# Patient Record
Sex: FEMALE | Race: BLACK OR AFRICAN AMERICAN | NOT HISPANIC OR LATINO | Employment: PART TIME
[De-identification: names, ages, dates, MRNs, and addresses within clinical notes are randomized per-mention and may not be internally consistent; named-entity substitution may affect disease eponyms.]

---
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
## 2020-03-10 ENCOUNTER — HEALTH MAINTENANCE LETTER (OUTPATIENT)
Age: 20
End: 2020-03-10

## 2020-12-27 ENCOUNTER — HEALTH MAINTENANCE LETTER (OUTPATIENT)
Age: 20
End: 2020-12-27

## 2021-04-24 ENCOUNTER — HEALTH MAINTENANCE LETTER (OUTPATIENT)
Age: 21
End: 2021-04-24

## 2021-10-09 ENCOUNTER — HEALTH MAINTENANCE LETTER (OUTPATIENT)
Age: 21
End: 2021-10-09

## 2022-05-16 ENCOUNTER — HEALTH MAINTENANCE LETTER (OUTPATIENT)
Age: 22
End: 2022-05-16

## 2022-09-11 ENCOUNTER — HEALTH MAINTENANCE LETTER (OUTPATIENT)
Age: 22
End: 2022-09-11

## 2022-11-25 ENCOUNTER — HOSPITAL ENCOUNTER (EMERGENCY)
Facility: CLINIC | Age: 22
Discharge: HOME | End: 2022-11-25
Attending: INTERNAL MEDICINE | Admitting: INTERNAL MEDICINE
Payer: COMMERCIAL

## 2022-11-25 VITALS
HEART RATE: 114 BPM | SYSTOLIC BLOOD PRESSURE: 141 MMHG | OXYGEN SATURATION: 98 % | DIASTOLIC BLOOD PRESSURE: 71 MMHG | TEMPERATURE: 98.6 F

## 2022-11-25 DIAGNOSIS — H60.391 INFECTIVE OTITIS EXTERNA, RIGHT: ICD-10-CM

## 2022-11-25 PROCEDURE — 99283 EMERGENCY DEPT VISIT LOW MDM: CPT

## 2022-11-25 PROCEDURE — 99284 EMERGENCY DEPT VISIT MOD MDM: CPT | Performed by: INTERNAL MEDICINE

## 2022-11-25 RX ORDER — NEOMYCIN SULFATE, POLYMYXIN B SULFATE, HYDROCORTISONE 3.5; 10000; 1 MG/ML; [USP'U]/ML; MG/ML
2 SOLUTION/ DROPS AURICULAR (OTIC) 3 TIMES DAILY
Qty: 10 ML | Refills: 0 | Status: SHIPPED | OUTPATIENT
Start: 2022-11-25

## 2022-11-25 ASSESSMENT — ENCOUNTER SYMPTOMS
ABDOMINAL PAIN: 0
FACIAL SWELLING: 0
NECK STIFFNESS: 0
EYE PAIN: 0
ARTHRALGIAS: 0
SORE THROAT: 0
SHORTNESS OF BREATH: 0
DIFFICULTY URINATING: 0
COLOR CHANGE: 0
CONFUSION: 0
FEVER: 0
RHINORRHEA: 0
EYE DISCHARGE: 0
EYE ITCHING: 0
HEADACHES: 0
COUGH: 0
EYE REDNESS: 0

## 2022-11-25 NOTE — ED TRIAGE NOTES
Pt presents with bilateral ear pain that started three weeks ago. Pt endorses symptoms not improving

## 2022-11-25 NOTE — ED PROVIDER NOTES
ED Provider Note  [facility]      History     Chief Complaint   Patient presents with     Otalgia     HPI  Becca Sweeney is a 22 year old female who presents with bilateral eay discomfort of weeks. No injuries or URI symptoms. Just treated with ear drops that has run out.    Past Medical History  No past medical history on file.  No past surgical history on file.  neomycin-polymyxin-hydrocortisone (CORTISPORIN) 3.5-74115-0 otic solution  Prenatal Vit-Fe Fumarate-FA (PRENATAL PO)      No Known Allergies  Family History  No family history on file.  Social History   Social History     Tobacco Use     Smoking status: Former   Substance Use Topics     Alcohol use: No     Drug use: No      Past medical history, past surgical history, medications, allergies, family history, and social history were reviewed with the patient. No additional pertinent items.       Review of Systems   Constitutional: Negative for fever.   HENT: Positive for ear discharge and ear pain. Negative for congestion, facial swelling, hearing loss, mouth sores, rhinorrhea and sore throat.    Eyes: Negative for pain, discharge, redness and itching.   Respiratory: Negative for cough and shortness of breath.    Cardiovascular: Negative for chest pain.   Gastrointestinal: Negative for abdominal pain.   Genitourinary: Negative for difficulty urinating.   Musculoskeletal: Negative for arthralgias and neck stiffness.   Skin: Negative for color change.   Neurological: Negative for headaches.   Psychiatric/Behavioral: Negative for confusion.     A complete review of systems was performed with pertinent positives and negatives noted in the HPI, and all other systems negative.    Physical Exam   BP: (!) 141/71  Pulse: 114  Temp: 98.6  F (37  C)  SpO2: 98 %  Physical Exam  Constitutional:       General: She is not in acute distress.     Appearance: She is not diaphoretic.   HENT:      Head: Atraumatic.      Right Ear: Tympanic membrane  normal. No decreased hearing noted. Drainage and tenderness present.      Left Ear: Tympanic membrane, ear canal and external ear normal. No decreased hearing noted.      Ears:        Mouth/Throat:      Mouth: Oropharynx is clear and moist.      Pharynx: No oropharyngeal exudate.   Eyes:      General: No scleral icterus.     Pupils: Pupils are equal, round, and reactive to light.   Cardiovascular:      Pulses: Intact distal pulses.      Heart sounds: Normal heart sounds.   Pulmonary:      Effort: No respiratory distress.      Breath sounds: Normal breath sounds.   Abdominal:      General: Bowel sounds are normal.      Palpations: Abdomen is soft.      Tenderness: There is no abdominal tenderness.   Musculoskeletal:         General: No tenderness or edema.   Skin:     General: Skin is warm.      Findings: No rash.         ED Course      Procedures                     No results found for any visits on 11/25/22.  Medications - No data to display     Assessments & Plan (with Medical Decision Making)  Right AOE, will restart cortisporin otic solution, bacitracin to ear lobe, follow up with her PMD in one week.       I have reviewed the nursing notes. I have reviewed the findings, diagnosis, plan and need for follow up with the patient.    Discharge Medication List as of 11/25/2022  1:31 PM      START taking these medications    Details   neomycin-polymyxin-hydrocortisone (CORTISPORIN) 3.5-49677-3 otic solution Place 2 drops into the right ear 3 times daily, Disp-10 mL, R-0, Local Print             Final diagnoses:   Infective otitis externa, right       --  Enoc Serrano MD  [facility] EMERGENCY DEPARTMENT  11/25/2022     Enoc Serrano MD  11/25/22 2041

## 2023-06-02 ENCOUNTER — MEDICAL CORRESPONDENCE (OUTPATIENT)
Dept: HEALTH INFORMATION MANAGEMENT | Facility: CLINIC | Age: 23
End: 2023-06-02
Payer: COMMERCIAL

## 2023-06-02 ENCOUNTER — TRANSCRIBE ORDERS (OUTPATIENT)
Dept: MATERNAL FETAL MEDICINE | Facility: CLINIC | Age: 23
End: 2023-06-02
Payer: COMMERCIAL

## 2023-06-02 ENCOUNTER — TRANSFERRED RECORDS (OUTPATIENT)
Dept: HEALTH INFORMATION MANAGEMENT | Facility: CLINIC | Age: 23
End: 2023-06-02
Payer: COMMERCIAL

## 2023-06-02 DIAGNOSIS — O26.90 PREGNANCY RELATED CONDITION, ANTEPARTUM: Primary | ICD-10-CM

## 2023-06-03 ENCOUNTER — HEALTH MAINTENANCE LETTER (OUTPATIENT)
Age: 23
End: 2023-06-03

## 2023-07-03 ENCOUNTER — PRE VISIT (OUTPATIENT)
Dept: MATERNAL FETAL MEDICINE | Facility: CLINIC | Age: 23
End: 2023-07-03
Payer: COMMERCIAL

## 2023-07-06 NOTE — PROGRESS NOTES
[facility]  Genetic Counseling Consult    Patient:  Becca Sweeney YOB: 2000   Date of Service:  23   MRN: [id number]    Becca was seen at the [facility] for genetic counseling consultation to discuss the options for testing for fetal chromosome abnormalities.  The patient was accompanied by her partner, Vlad to today's visit.     IMPRESSION/ PLAN   1. Becca was initially planning to pursue NIPT today. She reported ongoing spotting and cramping for 1 week. Ultrasound opening today was offered and accepted, which unfortunately revealed a demise. Given this is the couple's third miscarriage, genetic POC studies are recommended. Patient understandably saddened by today's information. She stated she does not have any thoughts or concerns about hurting herself or others and feels supported by her partner. We reviewed availability of behavioral health clinician, Mary Jane Gambino and patient is agreeable to Mary Jane reaching out to her on Monday.     2. Becca reported that her mother requires regular blood transfusions in [place]. We reviewed concern for possible hemoglobinopathy. Becca was initially planning to pursue hemoglobin evaluation today, however following demise on ultrasound, she declined any blood work. She is aware this remains available to her.     3. Becca shared that she believes she has a pacemaker placed as a child after episodes of fainting though is unclear of specific details/ diagnosis. We reviewed concern for cardiac condition such as an arrhythmia or cardiomyopathy which we reviewed can be inherited and it is recommended that she be referred to cardiology.     PREGNANCY HISTORY   /Parity:    Age at Delivery: 23 year old  Gestational Age: 13w4d   JERED: 2024, by Ultrasound             No significant complications or exposures were reported in the current pregnancy.  Becca's pregnancy history  "is significant for:     One term vaginal delivery and two SAB's    MEDICAL HISTORY   Becca shared that she believes she has a pacemaker placed as a child after episodes of fainting though is unclear of specific details/ diagnosis. We reviewed concern for cardiac condition such as an arrhythmia or cardiomyopathy which we reviewed can be inherited and it is recommended that she be referred to cardiology.        FAMILY HISTORY   A three-generation pedigree was obtained today and is scanned under the \"Media\" tab in Epic.     The following significant findings were reported today:   Becca's 26yo maternal half sister was reported to have metastatic breast cancer. We discussed how most cancer seen in families occurs sporadically, but about 5-10% may be due to an underlying genetic etiology. The couple was encouraged to share this family history information with their primary care providers to ensure appropriate screening. The [facility]'s cancer risk management clinic also remains available if she or their family members are interested in more information. She was encouraged to follow up with her sister to gather more information about if she was offered or underwent any genetic testing for her cancer history.   Becca reported that her mother requires regular blood transfusions in [place]. We reviewed concern for possible hemoglobinopathy and autosomal recessive inheritance. Becca was initially planning to pursue hemoglobin evaluation today, however following demise on ultrasound, she declined any blood work. She is aware this remains available to her. She can also follow up if any additional information is gathered regarding the specifics of her mother's condition for a more accurate risk assessment.   Vlad's father reportedly passed away at age 54 due to a DVT. We briefly reviewed that there are inherited thrombophilias where individuals can have a predisposition to developing blood clots. He was encouraged to " share this family history information with his primary care provider to ensure appropriate screening.     Otherwise, the reported family history is unremarkable for multiple miscarriages, stillbirths, birth defects, intellectual disabilities, known genetic conditions, and consanguinity.       CARRIER SCREENING   Expanded carrier screening is available to screen for autosomal recessive conditions and X-linked conditions in a large list of genes. Autosomal recessive conditions happen when a mutation has been inherited from the egg and sperm and include conditions like cystic fibrosis, thalassemia, hearing loss, spinal muscular atrophy, and more. X-linked conditions happen when a mutation has been inherited from the egg and include conditions like fragile X syndrome.  screening was also reviewed.    Becca reported that her mother requires regular blood transfusions in [place]. We reviewed concern for possible hemoglobinopathy. Becca was initially planning to pursue hemoglobin evaluation today, however following demise on ultrasound, she declined any blood work. She is aware this remains available to her. They declined expanded carrier screening.        RISK ASSESSMENT FOR CHROMOSOME CONDITIONS   We explained that the risk for fetal chromosome abnormalities increases with maternal age. We discussed specific features of common chromosome abnormalities, including Down syndrome, trisomy 13, trisomy 18, and sex chromosome trisomies.      At age 23 at midtrimester, the risk to have a baby with Down syndrome is 1 in 1087.    At age 23 at midtrimester, the risk to have a baby with any chromosome abnormality is 1 in 544.     GENETIC TESTING OPTIONS   Genetic testing during a pregnancy includes screening and diagnostic procedures.      We discussed the following screening options:   Non-invasive prenatal testing (NIPT)    Also called cell-free DNA screening because it detects chromosomes from the placenta in the pregnant  person's blood    Can be done any time after 10 weeks gestation    Screens for trisomy 21, trisomy 18, trisomy 13, and sex chromosome aneuploidies    Cannot screen for open neural tube defects, maternal serum AFP after 15 weeks is recommended    AFP only    Blood work from arm for levels of AFP (alpha-fetoprotein)    Can be done between 14w1d and 23w6d gestation    Screens for open neural tube defects like spina bifida    Recommended for those that do not want genetic testing (for chromosome conditions), those who did screening other than the quad screen, and those with a personal or family history of neural tube defects.      We discussed the following ultrasound options:  Nuchal translucency (NT) ultrasound    Ultrasound between [id number] that includes nuchal translucency measurement and nasal bone assessments    Nuchal translucency refers to the space at the back of the neck where fluid builds up. All babies at this stage have fluid and there is only concern if there is too much fluid    Nasal bone refers to the small bone in the nose. There is concern for conditions like Down syndrome if the bone cannot be seen at all    This ultrasound can be done as part of first trimester screening, at the same time as another screen (NIPT), at the same time as a CVS, or if the patients does not want genetic screening.    Markers on ultrasound detects about 70% of pregnancies with aneuploidy    Abnormalities on NT ultrasound can also increase the risk for a birth defect, like a heart defect      We discussed the following diagnostic options:   Chorionic villus sampling (CVS)    Invasive diagnostic procedure done between 10w0d and 13w6d    The procedure collects a small sample from the placenta for the purpose of chromosomal testing and/or other genetic testing    Diagnostic result; more than 99% sensitivity for fetal chromosome abnormalities    Cannot screen for open neural tube defects, maternal serum AFP after 15 weeks is  recommended    Amniocentesis    Invasive diagnostic procedure done after 15 weeks gestation    The procedure collects a small sample of amniotic fluid for the purpose of chromosomal testing and/or other genetic testing    Diagnostic result; more than 99% sensitivity for fetal chromosome abnormalities    Testing for AFP in the amniotic fluid can test for open neural tube defects      The benefits and limitations of noninvasive screening were reviewed. Screening tests provide a risk assessment (chance) specific to the pregnancy for certain fetal chromosome abnormalities but cannot definitively diagnose or exclude a fetal chromosome abnormality. Follow-up genetic counseling and consideration of diagnostic testing is recommended with any abnormal screening result. Diagnostic testing during a pregnancy is more certain and can test for more conditions. However, the tests do have a risk of miscarriage that requires careful consideration. These tests can detect fetal chromosome abnormalities with greater than 99% certainty. Results can be compromised by maternal cell contamination or mosaicism and are limited by the resolution of current genetic testing technology. There is no screening or diagnostic test that detects all forms of birth defects or intellectual disability.     It was a pleasure to be involved with [facility]. Face-to-face time of the meeting was 45 minutes.    Karrie Gooden MS, [facility]  Licensed Genetic Counselor  [facility]  Maternal Fetal Medicine  Ph: [phone]  [email or web address]

## 2023-07-07 ENCOUNTER — HOSPITAL ENCOUNTER (EMERGENCY)
Facility: CLINIC | Age: 23
End: 2023-07-07
Payer: COMMERCIAL

## 2023-07-07 ENCOUNTER — OFFICE VISIT (OUTPATIENT)
Dept: MATERNAL FETAL MEDICINE | Facility: CLINIC | Age: 23
End: 2023-07-07
Attending: ADVANCED PRACTICE MIDWIFE
Payer: COMMERCIAL

## 2023-07-07 ENCOUNTER — OFFICE VISIT (OUTPATIENT)
Dept: MATERNAL FETAL MEDICINE | Facility: CLINIC | Age: 23
End: 2023-07-07
Attending: OBSTETRICS & GYNECOLOGY
Payer: COMMERCIAL

## 2023-07-07 ENCOUNTER — HOSPITAL ENCOUNTER (OUTPATIENT)
Dept: ULTRASOUND IMAGING | Facility: CLINIC | Age: 23
Discharge: HOME | End: 2023-07-07
Attending: OBSTETRICS & GYNECOLOGY
Payer: COMMERCIAL

## 2023-07-07 DIAGNOSIS — O26.20 RECURRENT PREGNANCY LOSS, ANTEPARTUM CONDITION OR COMPLICATION: ICD-10-CM

## 2023-07-07 DIAGNOSIS — Z36.9 ENCOUNTER FOR ANTENATAL SCREENING: Primary | ICD-10-CM

## 2023-07-07 DIAGNOSIS — Z83.2 FAMILY HISTORY OF ANEMIA: ICD-10-CM

## 2023-07-07 DIAGNOSIS — O26.90 PREGNANCY RELATED CONDITION, ANTEPARTUM: ICD-10-CM

## 2023-07-07 DIAGNOSIS — O36.4XX0 FETAL DEATH AFFECTING MANAGEMENT OF MOTHER, SINGLE OR UNSPECIFIED FETUS: Primary | ICD-10-CM

## 2023-07-07 PROCEDURE — 96040 HC GENETIC COUNSELING, EACH 30 MINUTES: CPT | Performed by: GENETIC COUNSELOR, MS

## 2023-07-07 PROCEDURE — 99204 OFFICE O/P NEW MOD 45 MIN: CPT | Mod: 25 | Performed by: OBSTETRICS & GYNECOLOGY

## 2023-07-07 PROCEDURE — 76801 OB US < 14 WKS SINGLE FETUS: CPT

## 2023-07-07 PROCEDURE — 76801 OB US < 14 WKS SINGLE FETUS: CPT | Mod: 26 | Performed by: OBSTETRICS & GYNECOLOGY

## 2023-07-07 NOTE — PROGRESS NOTES
Patient referred for First Trimester Ultrasound.    This is single intrauterine gestation at 13 4/7 weeks by 8 week ultrasound.     Abnormal first trimester examination:  No cardiac activity is detected in the fetus and size is less than dates as shown.     Large bilateral cranial cystic structures. Calvarium difficult to observe. Findings consistent with cystic hygroma.    I have discussed today's findings with patient and partner: fetal death. Recommend proceeding with suction curettage to remove POC. Recommend genetic testing of POC.    This is the third pregnancy loss within the same period in pregnancy over the past 3 years and consistent with recurrent pregnancy loss.    Recommend evaluation of patient for recurrent pregnancy loss. Given today's findings, genetic analysis of POC will be important before pursuing parental karyotype for balanced translocation.     Additional tests include HSG, maternal PRL and TSH, and APLA.    Patient reports having an implanted pacemaker device which was not previously reported. Recommend consultation with cardiology prior to future pregnancy.    Patient reports that her mother has been recipient of multiple blood transfusions in [place]. She met with GC today to discuss testing for hemoglobinopathy beginning with hemoglobin electrophoresis.    I have contacted PCP to report findings and recommendation for care.    Work excuse given for 72 hours.    I personally spent a total of 45 minutes, including both face-to-face and non-face-to-face on the date of the encounter, addressing the above diagnosis. Activities performed in this time include chart review, obtaining / reviewing history, performing a medically necessary evaluation, documentation and Charge activities: counseling, care coordination, ordering tests and reviewing results, equivalent to medical decision making that is of moderate complexity.

## 2023-07-07 NOTE — LETTER
July 7, 2023      To Whom It May Concern,    Due to health complications, Becca FROST Sweeney (2000) will need to be off of work through 7/10/23.  If you have further questions, please do not hesitate to contact me.      Regards,      Dr. Gus URRUTIA [facility] [place]  [address]  [facility] [address]  [phone]

## 2023-07-11 ENCOUNTER — TELEPHONE (OUTPATIENT)
Dept: BEHAVIORAL HEALTH | Facility: CLINIC | Age: 23
End: 2023-07-11
Payer: COMMERCIAL

## 2023-07-11 NOTE — TELEPHONE ENCOUNTER
Patient outreach per [facility] referral. Spoke with patient and scheduled visit for 7/17.

## 2023-07-17 ENCOUNTER — TELEPHONE (OUTPATIENT)
Dept: MATERNAL FETAL MEDICINE | Facility: CLINIC | Age: 23
End: 2023-07-17
Payer: COMMERCIAL

## 2023-07-17 NOTE — TELEPHONE ENCOUNTER
Called patient as she had not arrived for previously scheduled [facility] visit. No answer, left VM welcoming patient tor reschedule.

## 2024-07-13 ENCOUNTER — HEALTH MAINTENANCE LETTER (OUTPATIENT)
Age: 24
End: 2024-07-13

## 2025-07-19 ENCOUNTER — HEALTH MAINTENANCE LETTER (OUTPATIENT)
Age: 25
End: 2025-07-19